# Patient Record
Sex: FEMALE | Race: ASIAN
[De-identification: names, ages, dates, MRNs, and addresses within clinical notes are randomized per-mention and may not be internally consistent; named-entity substitution may affect disease eponyms.]

---

## 2018-09-17 ENCOUNTER — HOSPITAL ENCOUNTER (EMERGENCY)
Dept: HOSPITAL 74 - FER | Age: 68
Discharge: HOME | End: 2018-09-17
Payer: COMMERCIAL

## 2018-09-17 VITALS — SYSTOLIC BLOOD PRESSURE: 161 MMHG | TEMPERATURE: 97.5 F | HEART RATE: 81 BPM | DIASTOLIC BLOOD PRESSURE: 83 MMHG

## 2018-09-17 VITALS — BODY MASS INDEX: 21.1 KG/M2

## 2018-09-17 DIAGNOSIS — R05: ICD-10-CM
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
## 2018-09-17 DIAGNOSIS — E78.5: ICD-10-CM

## 2018-09-17 DIAGNOSIS — S90.31XA: Primary | ICD-10-CM
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
## 2018-09-17 DIAGNOSIS — S90.01XA: ICD-10-CM

## 2018-09-17 DIAGNOSIS — I10: ICD-10-CM

## 2018-09-17 PROCEDURE — 2W3QX1Z IMMOBILIZATION OF RIGHT LOWER LEG USING SPLINT: ICD-10-PCS

## 2018-09-17 NOTE — PDOC
History of Present Illness





- General


Chief Complaint: Pain, Acute


Stated Complaint: MISSED STEP INJURY TO RIGHT FOOT


Time Seen by Provider: 09/17/18 07:42


History Source: Patient


Exam Limitations: No Limitations





- History of Present Illness


Initial Comments: 





09/17/18 08:16





68 YOF with h/o HTN, HLD presenting with right foot and ankle pain, swelling 

and bruising since last night after missing step and rolling extremity. no head 

injury or LOC. no meds taken. able to ambulate, applying ice with some relief.








Past History





- Past Medical History


Allergies/Adverse Reactions: 


 Allergies











Allergy/AdvReac Type Severity Reaction Status Date / Time


 


No Known Allergies Allergy   Verified 09/17/18 07:20











Home Medications: 


Ambulatory Orders





Rosuvastatin Calcium [Crestor] 10 mg PO HS 09/17/18 








COPD: No


HTN: Yes (UNKNOWN MEDICATION)


Hypercholesterolemia: Yes





- Suicide/Smoking/Psychosocial Hx


Smoking History: Never smoked


Have you smoked in the past 12 months: No


Information on smoking cessation initiated: No


Hx Alcohol Use: No


Drug/Substance Use Hx: No


Substance Use Type: None





**Review of Systems





- Review of Systems


Able to Perform ROS?: Yes


Comments:: 





09/17/18 08:17


Constitutional: no weakness


MUSCULOSKELETAL: +joint pain and swelling. No neck or back pain.


BACK: no back or neck pain


SKIN: no redness , no discharge, no rash. +bruising.


Hematologic: no easy bruising/bleeding. 


NEUROLOGIC: No weakness, numbness or tingling. 


All other systems reviewed and negative, or as documented in HPI. 








09/17/18 08:18








*Physical Exam





- Vital Signs


 Last Vital Signs











Temp Pulse Resp BP Pulse Ox


 


 97.5 F L  81   16   161/83   99 


 


 09/17/18 07:21  09/17/18 07:21  09/17/18 07:21  09/17/18 07:21  09/17/18 07:21














- Physical Exam


Comments: 





09/17/18 08:19





General: NAD, well appearing


Vascular: 2+ DP pulses symmetric and equal.


Back: no midline tenderness, no stepoffs, FROM


MSK:  soft compartment. no calf tenderness. no prox fibular tenderness. +right 

dorsum foot and lateral malleolar ecchymosis, swelling and TTP. 


Neuro: 5/5 plantar and dorsiflexion strength. SILT.  2+ DP pulses bilaterally.


Skin:  color normal color, warm and well perfused. +ecchymosis to right foot/

ankle.











ED Treatment Course





- RADIOLOGY


Radiology Studies Ordered: 














 Category Date Time Status


 


 ANKLE & FOOT-RIGHT* [RAD] Stat Radiology  09/17/18 07:43 Ordered














Medical Decision Making





- Medical Decision Making





09/17/18 08:20





68 YOF with HTN, HLD presenting with right foot and ankle pain/injury s/p 

rolling foot when she missed step. no neuro changes able to ambulate with some 

difficulty.





DDx ankle injury: Ankle/foot Sprain and contusion, extremity fracture, Carrion fx

, Lisfranc fx. hematoma. Low suspicion for compartment syndrome, NVI and no 

neuro deficits. low suspicion for vascular abnormality or infection.





vitals wnl. declines pain meds


XR foot and ankle with preserved joint spaces, no cortical defects or e/o Carrion/

Lisfranc.


foot and ankle contusion, treat with ACE wrap and stirrup for comfort, WBAT, 

ortho followup. rest and ice, elevate, expectant management and improvement 

over the next 3-5 days. I discussed the physical exam findings, ancillary test 

results and final diagnoses with the patient. I answered all of the patient's 

questions. The patient was satisfied with the care received and felt 

comfortable with the discharge plan and treatment plan. The patient will return 

to the Emergency Department with any new, persistent or worsening symptoms.








*DC/Admit/Observation/Transfer


Diagnosis at time of Disposition: 


 Contusion of right ankle, initial encounter, Contusion of right foot, initial 

encounter








- Discharge Dispostion


Disposition: HOME


Condition at time of disposition: Stable


Decision to Admit order: No





- Referrals


Referrals: 


Nelson Mcgee MD [Staff Physician] - 





- Patient Instructions


Printed Discharge Instructions:  DI for Ankle Sprain, DI for Foot Sprain


Additional Instructions: 


you most likely have a foot and ankle contusion, with negative X ray for 

fracture or broken bones


rest ice and elevate the extremity.


may walk as tolerated, should start improving over the next 3-5 days, you may 

get more swelling and bruising as it heals.


we have provided you an ankle brace to assist with the healing process and 

comfort.


motrin or tylenol for pain as needed. 


follow up with orthopedist or your primary doctor, referral provided. 





information also provided in your native language.








- Post Discharge Activity

## 2018-10-22 PROBLEM — Z00.00 ENCOUNTER FOR PREVENTIVE HEALTH EXAMINATION: Status: ACTIVE | Noted: 2018-10-22

## 2018-10-23 ENCOUNTER — APPOINTMENT (OUTPATIENT)
Dept: GASTROENTEROLOGY | Facility: CLINIC | Age: 68
End: 2018-10-23

## 2019-06-19 ENCOUNTER — APPOINTMENT (OUTPATIENT)
Dept: INTERNAL MEDICINE | Facility: CLINIC | Age: 69
End: 2019-06-19
Payer: MEDICARE

## 2019-06-19 VITALS
BODY MASS INDEX: 19.75 KG/M2 | HEIGHT: 65.5 IN | HEART RATE: 86 BPM | SYSTOLIC BLOOD PRESSURE: 140 MMHG | OXYGEN SATURATION: 97 % | DIASTOLIC BLOOD PRESSURE: 64 MMHG | WEIGHT: 120 LBS | TEMPERATURE: 98.2 F

## 2019-06-19 DIAGNOSIS — Z86.79 PERSONAL HISTORY OF OTHER DISEASES OF THE CIRCULATORY SYSTEM: ICD-10-CM

## 2019-06-19 DIAGNOSIS — L03.211 CELLULITIS OF FACE: ICD-10-CM

## 2019-06-19 DIAGNOSIS — Z82.49 FAMILY HISTORY OF ISCHEMIC HEART DISEASE AND OTHER DISEASES OF THE CIRCULATORY SYSTEM: ICD-10-CM

## 2019-06-19 PROCEDURE — 99203 OFFICE O/P NEW LOW 30 MIN: CPT

## 2019-06-19 RX ORDER — METHYLPREDNISOLONE 4 MG/1
4 TABLET ORAL
Qty: 1 | Refills: 0 | Status: COMPLETED | COMMUNITY
Start: 2019-06-19 | End: 2019-06-25

## 2019-06-19 RX ORDER — DOXYCYCLINE HYCLATE 100 MG/1
100 CAPSULE ORAL TWICE DAILY
Qty: 14 | Refills: 0 | Status: COMPLETED | COMMUNITY
Start: 2019-06-19 | End: 2019-06-26

## 2019-06-24 ENCOUNTER — APPOINTMENT (OUTPATIENT)
Dept: INTERNAL MEDICINE | Facility: CLINIC | Age: 69
End: 2019-06-24

## 2019-07-18 ENCOUNTER — APPOINTMENT (OUTPATIENT)
Dept: INTERNAL MEDICINE | Facility: CLINIC | Age: 69
End: 2019-07-18
Payer: MEDICARE

## 2019-07-18 VITALS
DIASTOLIC BLOOD PRESSURE: 60 MMHG | TEMPERATURE: 97.7 F | WEIGHT: 133 LBS | OXYGEN SATURATION: 97 % | SYSTOLIC BLOOD PRESSURE: 110 MMHG | HEART RATE: 77 BPM | HEIGHT: 65.5 IN | BODY MASS INDEX: 21.89 KG/M2

## 2019-07-18 DIAGNOSIS — H66.91 OTITIS MEDIA, UNSPECIFIED, RIGHT EAR: ICD-10-CM

## 2019-07-18 DIAGNOSIS — Z87.09 PERSONAL HISTORY OF OTHER DISEASES OF THE RESPIRATORY SYSTEM: ICD-10-CM

## 2019-07-18 LAB — S PYO AG SPEC QL IA: NEGATIVE

## 2019-07-18 PROCEDURE — 87880 STREP A ASSAY W/OPTIC: CPT | Mod: QW

## 2019-07-18 PROCEDURE — 99214 OFFICE O/P EST MOD 30 MIN: CPT | Mod: 25

## 2019-07-18 RX ORDER — AMOXICILLIN AND CLAVULANATE POTASSIUM 875; 125 MG/1; MG/1
875-125 TABLET, COATED ORAL TWICE DAILY
Qty: 20 | Refills: 0 | Status: ACTIVE | COMMUNITY
Start: 2019-07-18 | End: 1900-01-01

## 2019-07-18 RX ORDER — PRAVASTATIN SODIUM 10 MG/1
10 TABLET ORAL DAILY
Refills: 0 | Status: ACTIVE | COMMUNITY
Start: 2019-05-21

## 2019-07-18 RX ORDER — AMLODIPINE BESYLATE 10 MG/1
10 TABLET ORAL DAILY
Refills: 0 | Status: ACTIVE | COMMUNITY
Start: 2019-05-21

## 2019-07-31 NOTE — REVIEW OF SYSTEMS
[Fever] : fever [Chills] : chills [Earache] : earache [Chest Pain] : no chest pain [Palpitations] : no palpitations [Shortness Of Breath] : no shortness of breath [Cough] : no cough [FreeTextEntry2] : tactile

## 2019-07-31 NOTE — PHYSICAL EXAM
[No Acute Distress] : no acute distress [No Respiratory Distress] : no respiratory distress  [Clear to Auscultation] : lungs were clear to auscultation bilaterally [Normal Rate] : normal rate  [Normal S1, S2] : normal S1 and S2 [No Murmur] : no murmur heard [de-identified] : positive erythema in Canal and TM on right; Throat: no exudates; mild eythema [de-identified] : tender upper anterior cervical lymph node

## 2019-07-31 NOTE — HISTORY OF PRESENT ILLNESS
[FreeTextEntry8] : sorerthroat x 1 day\par side of jaw hurts\par Pain in right ear\par tactile fever, chills

## 2020-12-21 PROBLEM — H66.91 OTITIS MEDIA OF RIGHT EAR: Status: RESOLVED | Noted: 2019-07-18 | Resolved: 2020-12-21

## 2020-12-21 PROBLEM — Z87.09 HISTORY OF SORE THROAT: Status: RESOLVED | Noted: 2019-07-18 | Resolved: 2020-12-21

## 2024-02-19 ENCOUNTER — NON-APPOINTMENT (OUTPATIENT)
Age: 74
End: 2024-02-19